# Patient Record
Sex: FEMALE | Race: WHITE
[De-identification: names, ages, dates, MRNs, and addresses within clinical notes are randomized per-mention and may not be internally consistent; named-entity substitution may affect disease eponyms.]

---

## 2019-10-21 ENCOUNTER — TRANSCRIPTION ENCOUNTER (OUTPATIENT)
Age: 24
End: 2019-10-21

## 2019-12-08 ENCOUNTER — TRANSCRIPTION ENCOUNTER (OUTPATIENT)
Age: 24
End: 2019-12-08

## 2019-12-28 ENCOUNTER — TRANSCRIPTION ENCOUNTER (OUTPATIENT)
Age: 24
End: 2019-12-28

## 2021-06-02 ENCOUNTER — HOSPITAL ENCOUNTER (EMERGENCY)
Dept: HOSPITAL 74 - JER | Age: 26
Discharge: HOME | End: 2021-06-02
Payer: COMMERCIAL

## 2021-06-02 VITALS — DIASTOLIC BLOOD PRESSURE: 78 MMHG | TEMPERATURE: 98.5 F | HEART RATE: 88 BPM | SYSTOLIC BLOOD PRESSURE: 116 MMHG

## 2021-06-02 VITALS — BODY MASS INDEX: 21.2 KG/M2

## 2021-06-02 DIAGNOSIS — R10.31: Primary | ICD-10-CM

## 2021-06-02 LAB
ALBUMIN SERPL-MCNC: 4.3 G/DL (ref 3.4–5)
ALP SERPL-CCNC: 60 U/L (ref 45–117)
ALT SERPL-CCNC: 28 U/L (ref 13–61)
ANION GAP SERPL CALC-SCNC: 6 MMOL/L (ref 8–16)
APPEARANCE UR: CLEAR
AST SERPL-CCNC: 19 U/L (ref 15–37)
BASOPHILS # BLD: 1.1 % (ref 0–2)
BILIRUB SERPL-MCNC: 0.9 MG/DL (ref 0.2–1)
BILIRUB UR STRIP.AUTO-MCNC: NEGATIVE MG/DL
BUN SERPL-MCNC: 7.2 MG/DL (ref 7–18)
CALCIUM SERPL-MCNC: 9.4 MG/DL (ref 8.5–10.1)
CHLORIDE SERPL-SCNC: 107 MMOL/L (ref 98–107)
CO2 SERPL-SCNC: 26 MMOL/L (ref 21–32)
COLOR UR: YELLOW
CREAT SERPL-MCNC: 0.6 MG/DL (ref 0.55–1.3)
DEPRECATED RDW RBC AUTO: 12.7 % (ref 11.6–15.6)
EOSINOPHIL # BLD: 1.3 % (ref 0–4.5)
GLUCOSE SERPL-MCNC: 91 MG/DL (ref 74–106)
HCT VFR BLD CALC: 39.7 % (ref 32.4–45.2)
HGB BLD-MCNC: 13.6 GM/DL (ref 10.7–15.3)
KETONES UR QL STRIP: NEGATIVE
LEUKOCYTE ESTERASE UR QL STRIP.AUTO: NEGATIVE
LIPASE SERPL-CCNC: 93 U/L (ref 73–393)
LYMPHOCYTES # BLD: 41 % (ref 8–40)
MCH RBC QN AUTO: 32.1 PG (ref 25.7–33.7)
MCHC RBC AUTO-ENTMCNC: 34.3 G/DL (ref 32–36)
MCV RBC: 93.4 FL (ref 80–96)
MONOCYTES # BLD AUTO: 11 % (ref 3.8–10.2)
NEUTROPHILS # BLD: 45.6 % (ref 42.8–82.8)
NITRITE UR QL STRIP: NEGATIVE
PH UR: 6 [PH] (ref 5–8)
PLATELET # BLD AUTO: 213 K/MM3 (ref 134–434)
PMV BLD: 9.6 FL (ref 7.5–11.1)
PROT SERPL-MCNC: 7.6 G/DL (ref 6.4–8.2)
PROT UR QL STRIP: NEGATIVE
PROT UR QL STRIP: NEGATIVE
RBC # BLD AUTO: 4.25 M/MM3 (ref 3.6–5.2)
SODIUM SERPL-SCNC: 139 MMOL/L (ref 136–145)
SP GR UR: 1.01 (ref 1.01–1.03)
UROBILINOGEN UR STRIP-MCNC: 0.2 MG/DL (ref 0.2–1)
WBC # BLD AUTO: 5.4 K/MM3 (ref 4–10)

## 2021-06-02 PROCEDURE — 3E0333Z INTRODUCTION OF ANTI-INFLAMMATORY INTO PERIPHERAL VEIN, PERCUTANEOUS APPROACH: ICD-10-PCS

## 2022-06-25 ENCOUNTER — RESULT REVIEW (OUTPATIENT)
Age: 27
End: 2022-06-25

## 2022-09-08 PROBLEM — Z00.00 ENCOUNTER FOR PREVENTIVE HEALTH EXAMINATION: Status: ACTIVE | Noted: 2022-09-08

## 2022-09-28 ENCOUNTER — APPOINTMENT (OUTPATIENT)
Dept: VASCULAR SURGERY | Facility: CLINIC | Age: 27
End: 2022-09-28

## 2022-09-28 ENCOUNTER — TRANSCRIPTION ENCOUNTER (OUTPATIENT)
Age: 27
End: 2022-09-28

## 2022-09-28 VITALS
DIASTOLIC BLOOD PRESSURE: 63 MMHG | BODY MASS INDEX: 19.99 KG/M2 | HEIGHT: 65 IN | WEIGHT: 120 LBS | HEART RATE: 81 BPM | SYSTOLIC BLOOD PRESSURE: 101 MMHG

## 2022-09-28 DIAGNOSIS — R63.4 ABNORMAL WEIGHT LOSS: ICD-10-CM

## 2022-09-28 DIAGNOSIS — Z87.42 PERSONAL HISTORY OF OTHER DISEASES OF THE FEMALE GENITAL TRACT: ICD-10-CM

## 2022-09-28 DIAGNOSIS — Z82.49 FAMILY HISTORY OF ISCHEMIC HEART DISEASE AND OTHER DISEASES OF THE CIRCULATORY SYSTEM: ICD-10-CM

## 2022-09-28 PROCEDURE — 99203 OFFICE O/P NEW LOW 30 MIN: CPT

## 2022-09-29 PROBLEM — R63.4 WEIGHT LOSS, UNINTENTIONAL: Status: ACTIVE | Noted: 2022-09-29

## 2022-09-29 PROBLEM — Z82.49 FAMILY HISTORY OF CARDIAC DISORDER: Status: ACTIVE | Noted: 2022-09-29

## 2022-09-29 PROBLEM — Z87.42 HISTORY OF ENDOMETRIOSIS: Status: RESOLVED | Noted: 2022-09-29 | Resolved: 2022-09-29

## 2022-10-03 NOTE — ADDENDUM
[FreeTextEntry1] : I, Dr. Piotr Casanova, personally performed the evaluation and management (E/M) services for this new patient.  That E/M includes conducting the initial examination, assessing all conditions, and establishing the plan of care.  Today, my ACP, Donya Wren NP, was here to observe my evaluation and management services for this patient to be followed going forward. \par \par The documentation for this encounter was entered by Danielito Stanley acting as a scribe for Dr.Gary Casanova.\par

## 2022-10-03 NOTE — CONSULT LETTER
[Dear  ___] : Dear  [unfilled], [FreeTextEntry2] : Lovely Panchal DO\par [address] [facility], [place], operated [facility]\[place] [address] [FreeTextEntry1] : I saw Ms Bernie Rhoades. She had a recent diagnosis of median arcuate ligament syndrome.  She has debilitating abdominal pain, nausea and vomiting along with significant weight loss. She has tried to gained weight back but is unable to tolerate solids. In retrospective, she believes her symptoms have been present for over 10 years but since Spring of 2021 she has had significant symptomatology.   She has had GI evaluation including upper and lower endoscopy along with gastric motility studies.  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 Legs are well perfused. Blood pressure 101/63, heart rate 81 b/min.\par \par After careful review of outside imaging, I have recommended her to see Dr Killian, our expert with advanced upper GI minimally invasive surgical techniques. She will likely be a candidate for robotic release of the median arcuate ligament. We will coordinate the procedure together. I will keep you posted regarding her status. \par \par My complete EMR office note is below for your records.  [FreeTextEntry3] : Sincerely, \par \par Piotr Casanova M.D. \par , Surgical Services [facility]\par , Department of Surgery [facility]\par Professor of Surgery, [employer] at [facility]

## 2022-10-03 NOTE — ASSESSMENT
[Arterial/Venous Disease] : arterial/venous disease [Medication Management] : medication management [FreeTextEntry1] : 28 y/o F w/ debilitating, persistent abd pain, nausea and vomiting with weight loss and foot intolerance and evidence of MALS on recent MRA. On exam, epigastric bruit, abd soft, and nontender. LEs well perfused with no edema. We reviewed all outside records and imaging. Recommended for her to see  to discuss procedure to release the median arcuate ligament via laparoscopic approach. Will then coordinate with Dr Killian to complete procedure together. Pt to continue home meds and stay well hydrated.

## 2022-10-03 NOTE — PHYSICAL EXAM
[Respiratory Effort] : normal respiratory effort [Normal Rate and Rhythm] : normal rate and rhythm [No Rash or Lesion] : No rash or lesion [Alert] : alert [Oriented to Person] : oriented to person [Oriented to Place] : oriented to place [Oriented to Time] : oriented to time [Calm] : calm [2+] : left 2+ [Abdominal Bruit] : abdominal bruit present [JVD] : no jugular venous distention  [Carotid Bruits] : no carotid bruits [Right Carotid Bruit] : no bruit heard over the right carotid [Left Carotid Bruit] : no bruit heard over the left carotid [Ankle Swelling (On Exam)] : not present [Abdomen Masses] : No abdominal masses [Abdomen Tenderness] : ~T ~M No abdominal tenderness [de-identified] : WN/WD [de-identified] : epigastric bruit  [de-identified] : FROM

## 2022-10-04 ENCOUNTER — NON-APPOINTMENT (OUTPATIENT)
Age: 27
End: 2022-10-04

## 2022-10-06 ENCOUNTER — APPOINTMENT (OUTPATIENT)
Dept: BARIATRICS | Facility: CLINIC | Age: 27
End: 2022-10-06

## 2022-10-06 VITALS
SYSTOLIC BLOOD PRESSURE: 105 MMHG | HEIGHT: 65 IN | WEIGHT: 123.38 LBS | TEMPERATURE: 97.3 F | BODY MASS INDEX: 20.55 KG/M2 | DIASTOLIC BLOOD PRESSURE: 70 MMHG | OXYGEN SATURATION: 99 % | HEART RATE: 80 BPM

## 2022-10-06 PROCEDURE — 99204 OFFICE O/P NEW MOD 45 MIN: CPT

## 2022-10-06 NOTE — ADDENDUM
[FreeTextEntry1] : Documented by Eusebia Goff acting as a scribe for RUIZ Benjamin on 10/06/2022\par

## 2022-10-06 NOTE — ASSESSMENT
[FreeTextEntry1] : Patient is a 27 year old F with PMHx of +PFO, and recently diagnosed MALS and PSHx of endometrioma fulguration who is referred by  for the evaluation of median arcuate ligament syndrome. She reports of nausea, vomiting and abdominal pain especially after meals which is reported to radiate around the ribs. States that she is very active and endorses pain while exercising. She has recently lost 16 lbs in one month due to pain. Intolerant to solid food. On famotidine, Dexilant and Norethindrone. Reports that her symptoms are usually persistent for months then subsides but reappears again.  She was diagnosed with MALS in early Sept by vascular surgeon at [place], Dr. Jeffers.  Reports that she has performed  different testing, including complete GI workup, colonoscopy, endoscopies, gastric emptying studies, HIDA scan, cardiac Doppler, Doppler US and a manometry.  MRA was done in July which revealed compression of the celiac artery Recent US Doppler revealed an increase of turbulence intensity and compression of the celiac artery. She is not on any pain medication. She is a .

## 2022-10-06 NOTE — HISTORY OF PRESENT ILLNESS
[de-identified] : Patient is a 27 year old F with PMHx of +PFO, and recently diagnosed MALS and PSHx of endometrioma fulguration who is referred by  for the evaluation of median arcuate ligament syndrome. She reports of nausea, vomiting and abdominal pain especially after meals which is reported to radiate around the ribs. States that she is very active and endorses pain while exercising. She has recently lost 16 lbs in one month due to pain. Intolerant to solid food. On famotidine, Dexilant and Norethindrone. Reports that her symptoms are usually persistent for months then subsides but reappears again.  She was diagnosed with MALS in early Sept by vascular surgeon at [place], Dr. Jeffers.  Reports that she has performed  different testing, including complete GI workup, colonoscopy, endoscopies, gastric emptying studies, HIDA scan, cardiac Doppler, Doppler US and a manometry.  MRA was done in July which revealed compression of the celiac arter Recent US Doppler revealed an increase of turbulence intensity and compression of the celiac artery. She is not on any pain medication. She is a .

## 2022-10-06 NOTE — END OF VISIT
[Time Spent: ___ minutes] : I have spent [unfilled] minutes of time on the encounter. [FreeTextEntry3] : All medical record entries made by the Scribe were at my, RUIZ Benjamin , direction and personally dictated by me on 10/06/2022 . I have reviewed the chart and agree that the record accurately reflects my personal performance of the history, physical exam, assessment and plan. I have also personally directed, reviewed, and agreed with the chart.\par

## 2022-10-06 NOTE — PHYSICAL EXAM
[No Rash or Lesion] : No rash or lesion [Alert] : alert [Oriented to Person] : oriented to person [Oriented to Place] : oriented to place [Oriented to Time] : oriented to time [Calm] : calm [de-identified] : not in any acute distress [de-identified] : full range of motion

## 2022-10-26 PROBLEM — Z00.00 ENCOUNTER FOR PREVENTIVE HEALTH EXAMINATION: Status: ACTIVE | Noted: 2022-10-26

## 2022-11-04 ENCOUNTER — APPOINTMENT (OUTPATIENT)
Dept: CARDIOLOGY | Facility: CLINIC | Age: 27
End: 2022-11-04

## 2022-11-09 ENCOUNTER — APPOINTMENT (OUTPATIENT)
Dept: GASTROENTEROLOGY | Facility: CLINIC | Age: 27
End: 2022-11-09

## 2022-11-21 ENCOUNTER — NON-APPOINTMENT (OUTPATIENT)
Age: 27
End: 2022-11-21

## 2022-11-21 ENCOUNTER — APPOINTMENT (OUTPATIENT)
Dept: CARDIOLOGY | Facility: CLINIC | Age: 27
End: 2022-11-21

## 2022-11-21 VITALS
HEIGHT: 65 IN | BODY MASS INDEX: 19.99 KG/M2 | WEIGHT: 120 LBS | OXYGEN SATURATION: 100 % | DIASTOLIC BLOOD PRESSURE: 66 MMHG | HEART RATE: 78 BPM | SYSTOLIC BLOOD PRESSURE: 103 MMHG

## 2022-11-21 DIAGNOSIS — Z87.898 PERSONAL HISTORY OF OTHER SPECIFIED CONDITIONS: ICD-10-CM

## 2022-11-21 PROCEDURE — 99204 OFFICE O/P NEW MOD 45 MIN: CPT

## 2022-11-21 PROCEDURE — 99244 OFF/OP CNSLTJ NEW/EST MOD 40: CPT

## 2022-11-21 PROCEDURE — 93000 ELECTROCARDIOGRAM COMPLETE: CPT

## 2022-12-07 NOTE — REASON FOR VISIT
[FreeTextEntry3] : DearDr.Jocelyne. \par         . I saw your patient JULIO C AUGUST on 11/21/2022 . Please see the note below for the assessment and plan. \par \par JULIO C AUGUST  was seen  for an initial consultation at the Cardiogenomics Program at [facility] on 11/21/2022.   Ms. AUGUST was referred by   for hereditary cardiac predisposition risk assessment and counseling, due to concern for EDS\par \par

## 2022-12-07 NOTE — PHYSICAL EXAM
Mom called back. Mom informed that pt is allowed to ride a bike, 3-4 weeks post-op. Pt is now about 3.5 weeks post op. Mom informed of this and to let pain be pt's guide. If riding a bike is too painful, not to do it. Discouraged long bike rides as well.  Violet Samples [Extraocular Movements Intact] : extraocular movements were intact [Normal Uvula] : normal uvula [Normal] : no mass, no hepatosplenomegaly [Scoliosis] : scoliosis [Right] : Right: Y [Left] : Left: Y [] : Yes [Total Score ___] : Total Score = [unfilled] [Unusually soft or velvety skin] : Unusually soft or velvety skin [Mild skin hyperextensibility] : Mild skin hyperextensibility [Cleft Lip] : no cleft lip [Pectus Deformity] : no pectus deformity [Birthmark] : no birthmark [Tremor] : no tremor [de-identified] : high palate [FreeTextEntry2] : 166 [FreeTextEntry3] : 172 [FreeTextEntry6] : 85 [TWNoteComboBox1] : 0 [TWNoteComboBox2] : 1 [TWNoteComboBox6] : 1 [TWNoteComboBox7] : 0 [de-identified] : 1 [de-identified] : 1

## 2022-12-07 NOTE — HISTORY OF PRESENT ILLNESS
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

## 2022-12-07 NOTE — FAMILY HISTORY
[FreeTextEntry1] : FamilyHistory_20_twCiteListControlStart FamilyHistory_20_twCiteListControlEnd [id number] \par A four-generation family history was constructed and scanned into [employer]. \par Family history is significant for: \par siblings\par 28 yo borther healthy\par no children\par \par Mother 67 yo hx HLD, low bone density\par Maternal aunts x1, 63 yo siatica, low bone density, hypermobility\par Maternal uncles none\par Maternal Grandmother dec 93 yo eds like symptoms\par Maternal Grandfather dec 84 yo hx CAD s/p PCI. aplastic anemia\par \par Father 68 yo hx HTN, hx MI medically managed \par Paternal aunts x1 dec 50s br CA, + flexible: son 41 yo hx DM , son 45 yo\par Paternal uncles none\par Paternal Grandfather flexible dec cirrhosis \par Paternal Grandmother dec late 70s br CA\par \par children:none\par \par her maternal families originate from [facility] and paternal families originate from , possible [place]\par No Ashkenazi Restorationist ancestry. \par Family history was negative for consanguinity  \par No family history of SIDS\par  \par \par  [FreeTextEntry2] : [place]  [FreeTextEntry3] : , possible [place]

## 2023-01-23 ENCOUNTER — APPOINTMENT (OUTPATIENT)
Dept: CARDIOLOGY | Facility: CLINIC | Age: 28
End: 2023-01-23

## 2023-01-25 ENCOUNTER — APPOINTMENT (OUTPATIENT)
Dept: CARDIOLOGY | Facility: CLINIC | Age: 28
End: 2023-01-25
Payer: MEDICAID

## 2023-01-25 DIAGNOSIS — Z87.898 PERSONAL HISTORY OF OTHER SPECIFIED CONDITIONS: ICD-10-CM

## 2023-01-25 PROCEDURE — 99214 OFFICE O/P EST MOD 30 MIN: CPT | Mod: 95

## 2023-01-25 NOTE — HISTORY OF PRESENT ILLNESS
[Home] : at home, [unfilled] , at the time of the visit. 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

## 2023-02-07 ENCOUNTER — APPOINTMENT (OUTPATIENT)
Dept: RHEUMATOLOGY | Facility: CLINIC | Age: 28
End: 2023-02-07
Payer: MEDICAID

## 2023-02-07 ENCOUNTER — LABORATORY RESULT (OUTPATIENT)
Age: 28
End: 2023-02-07

## 2023-02-07 VITALS
SYSTOLIC BLOOD PRESSURE: 101 MMHG | HEART RATE: 70 BPM | BODY MASS INDEX: 19.99 KG/M2 | DIASTOLIC BLOOD PRESSURE: 65 MMHG | WEIGHT: 120 LBS | RESPIRATION RATE: 18 BRPM | HEIGHT: 65 IN | OXYGEN SATURATION: 100 % | TEMPERATURE: 97.4 F

## 2023-02-07 DIAGNOSIS — R10.9 UNSPECIFIED ABDOMINAL PAIN: ICD-10-CM

## 2023-02-07 DIAGNOSIS — R09.89 OTHER SPECIFIED SYMPTOMS AND SIGNS INVOLVING THE CIRCULATORY AND RESPIRATORY SYSTEMS: ICD-10-CM

## 2023-02-07 DIAGNOSIS — R11.2 NAUSEA WITH VOMITING, UNSPECIFIED: ICD-10-CM

## 2023-02-07 DIAGNOSIS — K55.1 CHRONIC VASCULAR DISORDERS OF INTESTINE: ICD-10-CM

## 2023-02-07 PROCEDURE — 99204 OFFICE O/P NEW MOD 45 MIN: CPT | Mod: 25

## 2023-02-07 RX ORDER — DEXLANSOPRAZOLE 60 MG/1
60 CAPSULE, DELAYED RELEASE ORAL
Refills: 0 | Status: DISCONTINUED | COMMUNITY
End: 2023-02-07

## 2023-02-07 RX ORDER — PROMETHAZINE HYDROCHLORIDE 25 MG/1
25 TABLET ORAL 3 TIMES DAILY
Qty: 40 | Refills: 0 | Status: DISCONTINUED | COMMUNITY
Start: 2022-10-06 | End: 2023-02-07

## 2023-02-07 RX ORDER — FAMOTIDINE 40 MG/1
40 TABLET, FILM COATED ORAL
Refills: 0 | Status: DISCONTINUED | COMMUNITY
End: 2023-02-07

## 2023-02-07 RX ORDER — NORETHINDRONE 0.35 MG/1
0.35 TABLET ORAL
Refills: 0 | Status: DISCONTINUED | COMMUNITY
End: 2023-02-07

## 2023-02-07 RX ORDER — FLUDROCORTISONE ACETATE 0.1 MG/1
0.1 TABLET ORAL
Qty: 180 | Refills: 0 | Status: DISCONTINUED | COMMUNITY
Start: 2023-01-25 | End: 2023-02-07

## 2023-02-08 LAB
ALBUMIN SERPL ELPH-MCNC: 4.9 G/DL
ALP BLD-CCNC: 65 U/L
ALT SERPL-CCNC: 15 U/L
ANCA AB SER-IMP: NEGATIVE
ANION GAP SERPL CALC-SCNC: 12 MMOL/L
APPEARANCE: CLEAR
AST SERPL-CCNC: 16 U/L
BACTERIA: NEGATIVE
BASOPHILS # BLD AUTO: 0.04 K/UL
BASOPHILS NFR BLD AUTO: 0.6 %
BILIRUB SERPL-MCNC: 1.2 MG/DL
BILIRUBIN URINE: NEGATIVE
BLOOD URINE: NEGATIVE
BUN SERPL-MCNC: 7 MG/DL
C-ANCA SER-ACNC: NEGATIVE
CALCIUM SERPL-MCNC: 10 MG/DL
CD16+CD56+ CELLS # BLD: 143 CELLS/UL
CD16+CD56+ CELLS NFR BLD: 6 %
CD19 CELLS NFR BLD: 459 CELLS/UL
CD3 CELLS # BLD: 1796 CELLS/UL
CD3 CELLS NFR BLD: 73 %
CD3+CD4+ CELLS # BLD: 993 CELLS/UL
CD3+CD4+ CELLS NFR BLD: 40 %
CD3+CD4+ CELLS/CD3+CD8+ CLL SPEC: 1.4 RATIO
CD3+CD8+ CELLS # SPEC: 708 CELLS/UL
CD3+CD8+ CELLS NFR BLD: 29 %
CELLS.CD3-CD19+/CELLS IN BLOOD: 19 %
CHLORIDE SERPL-SCNC: 104 MMOL/L
CO2 SERPL-SCNC: 24 MMOL/L
COLOR: COLORLESS
CREAT SERPL-MCNC: 0.61 MG/DL
CREAT SPEC-SCNC: 20 MG/DL
CREAT/PROT UR: NORMAL RATIO
CRP SERPL-MCNC: <3 MG/L
EGFR: 125 ML/MIN/1.73M2
EOSINOPHIL # BLD AUTO: 0.06 K/UL
EOSINOPHIL NFR BLD AUTO: 0.9 %
ERYTHROCYTE [SEDIMENTATION RATE] IN BLOOD BY WESTERGREN METHOD: 4 MM/HR
GLUCOSE QUALITATIVE U: NEGATIVE
GLUCOSE SERPL-MCNC: 84 MG/DL
HCT VFR BLD CALC: 38.4 %
HGB BLD-MCNC: 12.3 G/DL
HYALINE CASTS: 1 /LPF
IMM GRANULOCYTES NFR BLD AUTO: 0.3 %
KETONES URINE: NORMAL
LEUKOCYTE ESTERASE URINE: NEGATIVE
LYMPHOCYTES # BLD AUTO: 2.53 K/UL
LYMPHOCYTES NFR BLD AUTO: 39.2 %
MAN DIFF?: NORMAL
MCHC RBC-ENTMCNC: 30.8 PG
MCHC RBC-ENTMCNC: 32 GM/DL
MCV RBC AUTO: 96.2 FL
MICROSCOPIC-UA: NORMAL
MONOCYTES # BLD AUTO: 0.52 K/UL
MONOCYTES NFR BLD AUTO: 8 %
NEUTROPHILS # BLD AUTO: 3.29 K/UL
NEUTROPHILS NFR BLD AUTO: 51 %
NITRITE URINE: NEGATIVE
P-ANCA TITR SER IF: NEGATIVE
PH URINE: 7.5
PLATELET # BLD AUTO: 237 K/UL
POTASSIUM SERPL-SCNC: 4.1 MMOL/L
PROT SERPL-MCNC: 7.3 G/DL
PROT UR-MCNC: <4 MG/DL
PROTEIN URINE: NEGATIVE
RBC # BLD: 3.99 M/UL
RBC # FLD: 12.5 %
RED BLOOD CELLS URINE: 3 /HPF
SODIUM SERPL-SCNC: 140 MMOL/L
SPECIFIC GRAVITY URINE: 1
SQUAMOUS EPITHELIAL CELLS: 1 /HPF
UROBILINOGEN URINE: NORMAL
WBC # FLD AUTO: 6.46 K/UL
WHITE BLOOD CELLS URINE: 1 /HPF

## 2023-02-09 LAB
IGG SUBSET TOTAL IGG: 1037 MG/DL
IGG1 SER-MCNC: 555 MG/DL
IGG2 SER-MCNC: 251 MG/DL
IGG3 SER-MCNC: 77 MG/DL
IGG4 SER-MCNC: 13 MG/DL
PROTEINASE3 AB SER IA-ACNC: 5.9 UNITS
PROTEINASE3 AB SER-ACNC: NEGATIVE

## 2023-02-10 LAB
A-TUMOR NECROSIS FACT SERPL-MCNC: 18 PG/ML
ALBUMIN MFR SERPL ELPH: 61.5 %
ALBUMIN SERPL-MCNC: 4.5 G/DL
ALBUMIN/GLOB SERPL: 1.6 RATIO
ALPHA1 GLOB MFR SERPL ELPH: 4 %
ALPHA1 GLOB SERPL ELPH-MCNC: 0.3 G/DL
ALPHA2 GLOB MFR SERPL ELPH: 8.2 %
ALPHA2 GLOB SERPL ELPH-MCNC: 0.6 G/DL
B-GLOBULIN MFR SERPL ELPH: 10.5 %
B-GLOBULIN SERPL ELPH-MCNC: 0.8 G/DL
DEPRECATED KAPPA LC FREE/LAMBDA SER: 1.24 RATIO
GAMMA GLOB FLD ELPH-MCNC: 1.2 G/DL
GAMMA GLOB MFR SERPL ELPH: 15.8 %
IGA SER QL IEP: 156 MG/DL
IGG SER QL IEP: 1101 MG/DL
IGM SER QL IEP: 210 MG/DL
IGNF SERPL-MCNC: <4.2 PG/ML
IL10 SERPL-MCNC: <2.8 PG/ML
IL12 SERPL-MCNC: 2.5 PG/ML
IL13 SERPL-MCNC: 12.3 PG/ML
IL17A SERPL-MCNC: 2.2 PG/ML
IL2 SERPL-MCNC: 644.8 PG/ML
IL2 SERPL-MCNC: <2.1 PG/ML
IL4 SERPL-MCNC: <2.2 PG/ML
IL6 SERPL-MCNC: 7.1 PG/ML
IL8 SERPL-MCNC: <3 PG/ML
INTERLEUKIN 1 BETA: <6.5 PG/ML
INTERLEUKIN 5: <2.1 PG/ML
INTERPRETATION SERPL IEP-IMP: NORMAL
KAPPA LC CSF-MCNC: 0.98 MG/DL
KAPPA LC SERPL-MCNC: 1.22 MG/DL
M PROTEIN SPEC IFE-MCNC: NORMAL
MYELOPEROXIDASE AB SER QL IA: <5 UNITS
MYELOPEROXIDASE CELLS FLD QL: NEGATIVE
PROT SERPL-MCNC: 7.3 G/DL
PROT SERPL-MCNC: 7.3 G/DL
VASCULAR ENDOTHELIAL GF: 10 PG/ML

## 2023-02-13 LAB
ANTI-BETA2 GLYCOPROTEIN 1 IGA CONCENTRATION: 2 U/ML
ANTI-BETA2 GLYCOPROTEIN 1 IGG CONCENTRATION: 1 U/ML
ANTI-BETA2 GLYCOPROTEIN 1 IGG CONCENTRATION: 1 U/ML
ANTI-BETA2 GLYCOPROTEIN 1 IGM CONCENTRATION: <2.4 U/ML
ANTI-BETA2 GLYCOPROTEIN 1 IGM CONCENTRATION: <2.4 U/ML
ANTI-C1Q IGG CONCENTRATION: 8 UNITS
ANTI-CARDIOLIPIN IGA CONCENTRATION: 2 APL
ANTI-CARDIOLIPIN IGG CONCENTRATION: 1 GPL
ANTI-CARDIOLIPIN IGG CONCENTRATION: 1 GPL
ANTI-CARDIOLIPIN IGM CONCENTRATION: 2 MPL
ANTI-CARDIOLIPIN IGM CONCENTRATION: 2 MPL
ANTI-CENP IGG CONCENTRATION: <0.4 U/ML
ANTI-CYCLIC CITRULLINATED PEPTIDE IGG CONCENTRATION: 2 U/ML
ANTI-DOUBLE-STRANDED DNA IGG CONCENTRATION: 34 IU/ML
ANTI-HISTONE IGG CONCENTRATION: 0 UNITS
ANTI-JO-1 IGG CONCENTRATION: <0.3 U/ML
ANTI-NUCLEAR ANTIBODIES - CYTOPLASMIC PATTERN: NORMAL
ANTI-NUCLEAR ANTIBODIES - PRIMARY NUCLEAR PATTERN: NORMAL
ANTI-NUCLEAR ANTIBODIES - PRIMARY PATTERN TITER: NEGATIVE
ANTI-NUCLEAR ANTIBODIES IGG CONCENTRATION: 12 UNITS
ANTI-PHOSPHATIDYLSERINE/PROTHROMBIN IGG CONCENTRATION: 9 UNITS
ANTI-PHOSPHATIDYLSERINE/PROTHROMBIN IGM CONCENTRATION: 37 UNITS
ANTI-RIBOSOMAL P IGG CONCENTRATION: <0.5 U/ML
ANTI-RNA POL III IGG CONCENTRATION: <0.7 U/ML
ANTI-RNP70 IGG CONCENTRATION: 0 U/ML
ANTI-RO52 IGG CONCENTRATION: <0.3 U/ML
ANTI-RO60 IGG CONCENTRATION: <0.4 U/ML
ANTI-SCL-70 IGG CONCENTRATION: 1 U/ML
ANTI-SMITH IGG CONCENTRATION: <0.7 U/ML
ANTI-SS-B (LA) IGG CONCENTRATION: <0.4 U/ML
ANTI-THYROGLOBULIN IGG CONCENTRATION: 20 IU/ML
ANTI-THYROID PEROXIDASE IGG CONCENTRATION: <4 IU/ML
ANTI-U1RNP IGG CONCENTRATION: 2 U/ML
AVISE LUPUS INDEX: -2
AVISE LUPUS RESULT: NEGATIVE
B-LYMPHOCYTE-BOUND C4D (BC4D) LEVEL: 23
ERYTHROCYTE-BOUND C4D (EC4D) LEVEL: 3
RHEUMATOID FACTOR (IGA) CONCENTRATION: 2 IU/ML
RHEUMATOID FACTOR (IGM) CONCENTRATION: 1 IU/ML

## 2023-02-14 LAB
MISCELLANEOUS TEST: NORMAL
PROC NAME: NORMAL

## 2023-02-16 ENCOUNTER — APPOINTMENT (OUTPATIENT)
Dept: HEART AND VASCULAR | Facility: CLINIC | Age: 28
End: 2023-02-16

## 2023-02-20 LAB
CA VI IGA AB: 3.4 EU/ML
CA VI IGG AB: 5.9 EU/ML
CA VI IGM AB: 10.3 EU/ML
PSP IGA AB: NORMAL
PSP IGG AB: 63.6 EU/ML
PSP IGM AB: 5.9 EU/ML
SEROLOGY COMMENTS: NORMAL
SP-1 IGA AB: 1.1 EU/ML
SP-1 IGG AB: 93.6 EU/ML
SP-1 IGM AB: 16.2 EU/ML

## 2023-02-21 ENCOUNTER — APPOINTMENT (OUTPATIENT)
Dept: CARDIOLOGY | Facility: CLINIC | Age: 28
End: 2023-02-21
Payer: MEDICAID

## 2023-02-21 PROBLEM — Z87.898 HISTORY OF PALPITATIONS: Status: ACTIVE | Noted: 2023-02-21

## 2023-02-21 LAB
CENP-A: <11 SI
CENP-B: <11 SI
FIBRILLARIN: <11 SI
PM/SCL-100: <11 SI
PM/SCL-75: <11 SI
RP11: <11 SI
RP155: <11 SI
SCL-70: <11 SI
TH/TO: <11 SI
U1-SNRNP RNP A: <11 SI
U1-SNRNP RNP C: <11 SI
U1-SNRNP RNP-70KD: <11 SI

## 2023-02-21 PROCEDURE — 99215 OFFICE O/P EST HI 40 MIN: CPT | Mod: 95

## 2023-02-21 NOTE — REASON FOR VISIT
[FreeTextEntry3] : Dear Dr. Evans     . I saw your patient MARISA AUGUST on 11/21/2022 . Please see the note below for the assessment and plan. \par \par MARISA AUGUST  was seen  for an initial consultation at the Cardiogenomics Program at [facility] on 11/21/2022.   Ms. AUGUST was referred by  Nathan davenport for hereditary cardiac predisposition risk assessment and counseling, due to concern for CTD\par

## 2023-02-21 NOTE — HISTORY OF PRESENT ILLNESS
[Home] : at home, [unfilled] , at the time of the visit. [Medical Office: ([facility])___] : at the medical office located in  [Verbal consent obtained from patient] : the patient, [unfilled] [FreeTextEntry1] : MARISA AUGUST is a 28 yo F hx EDS, median arcuate ligament syndrome s/p release, Migranes , POTS, and SMA stenosis\par \par she underwent genetic testing with the HDCT panel and today presents for results

## 2023-02-21 NOTE — REVIEW OF SYSTEMS
[Patient Intake Form Reviewed] : Patient intake form was reviewed [Palpitations] : palpitations [Exercise Intolerance] : exercise intolerance

## 2023-02-21 NOTE — HISTORY OF PRESENT ILLNESS
[FreeTextEntry1] : MARISA AUGUST is a 28 yo F hx EDS, median arcuate ligament syndrome s/p release, Migranes , POTS, and SMA stenosis\par \par she underwent genetic testing with the HDCT panel and today presents for results

## 2023-02-21 NOTE — REASON FOR VISIT
[FreeTextEntry3] : Dear Dr. Evans     . I saw your patient MARISA AUGUST on 2/21/23. Please see the note below for the assessment and plan. \par \par MARISA AUGUST  was seen  for an initial consultation at the Cardiogenomics Program at [facility] on 11/21/2022.   Ms. AUGUST was referred by  Nathan davenport for hereditary cardiac predisposition risk assessment and counseling, due to concern for CTD\par

## 2023-02-22 ENCOUNTER — APPOINTMENT (OUTPATIENT)
Dept: ELECTROPHYSIOLOGY | Facility: CLINIC | Age: 28
End: 2023-02-22
Payer: MEDICAID

## 2023-02-22 ENCOUNTER — APPOINTMENT (OUTPATIENT)
Dept: CARDIOLOGY | Facility: CLINIC | Age: 28
End: 2023-02-22

## 2023-02-22 LAB
EJ AB SER QL: NEGATIVE
ENA JO1 AB SER IA-ACNC: <20 UNITS
ENA PM/SCL AB SER-ACNC: <20 UNITS
ENA SM+RNP AB SER IA-ACNC: <20 UNITS
ENA SS-A IGG SER QL: <20 UNITS
FIBRILLARIN AB SER QL: NEGATIVE
KU AB SER QL: NEGATIVE
MDA-5 (P140)(CADM-140): <20 UNITS
MI2 AB SER QL: NEGATIVE
NXP-2 (P140): <20 UNITS
OJ AB SER QL: NEGATIVE
PL12 AB SER QL: NEGATIVE
PL7 AB SER QL: NEGATIVE
SRP AB SERPL QL: NEGATIVE
TIF GAMMA (P155/140): <20 UNITS
U2 SNRNP AB SER QL: NEGATIVE

## 2023-03-05 LAB — PERIODIC FEVER SYNDROMES PANEL (7 GENES): NEGATIVE

## 2023-03-06 ENCOUNTER — FORM ENCOUNTER (OUTPATIENT)
Age: 28
End: 2023-03-06

## 2023-03-15 ENCOUNTER — APPOINTMENT (OUTPATIENT)
Dept: HEART AND VASCULAR | Facility: CLINIC | Age: 28
End: 2023-03-15

## 2023-03-16 ENCOUNTER — NON-APPOINTMENT (OUTPATIENT)
Age: 28
End: 2023-03-16

## 2023-03-29 PROCEDURE — 93248 EXT ECG>7D<15D REV&INTERPJ: CPT

## 2023-04-07 ENCOUNTER — APPOINTMENT (OUTPATIENT)
Dept: RHEUMATOLOGY | Facility: CLINIC | Age: 28
End: 2023-04-07
Payer: MEDICAID

## 2023-04-07 DIAGNOSIS — I77.4 CELIAC ARTERY COMPRESSION SYNDROME: ICD-10-CM

## 2023-04-07 DIAGNOSIS — R21 RASH AND OTHER NONSPECIFIC SKIN ERUPTION: ICD-10-CM

## 2023-04-07 DIAGNOSIS — R89.4 ABNORMAL IMMUNOLOGICAL FINDINGS IN SPECIMENS FROM OTHER ORGANS, SYSTEMS AND TISSUES: ICD-10-CM

## 2023-04-07 PROCEDURE — 99214 OFFICE O/P EST MOD 30 MIN: CPT | Mod: 95

## 2023-04-10 ENCOUNTER — LABORATORY RESULT (OUTPATIENT)
Age: 28
End: 2023-04-10

## 2023-04-10 PROBLEM — I77.4 MEDIAN ARCUATE LIGAMENT SYNDROME: Status: ACTIVE | Noted: 2022-09-29

## 2023-04-10 PROBLEM — R89.4 SEROLOGIC ABNORMALITY: Status: ACTIVE | Noted: 2023-03-16

## 2023-04-10 LAB
25(OH)D3 SERPL-MCNC: 38.5 NG/ML
BASOPHILS # BLD AUTO: 0.05 K/UL
BASOPHILS NFR BLD AUTO: 0.9 %
EOSINOPHIL # BLD AUTO: 0.02 K/UL
EOSINOPHIL NFR BLD AUTO: 0.4 %
ERYTHROCYTE [SEDIMENTATION RATE] IN BLOOD BY WESTERGREN METHOD: 4 MM/HR
HCT VFR BLD CALC: 34.8 %
HGB BLD-MCNC: 11.4 G/DL
IMM GRANULOCYTES NFR BLD AUTO: 0.2 %
LYMPHOCYTES # BLD AUTO: 2.22 K/UL
LYMPHOCYTES NFR BLD AUTO: 40.5 %
MAN DIFF?: NORMAL
MCHC RBC-ENTMCNC: 30.7 PG
MCHC RBC-ENTMCNC: 32.8 GM/DL
MCV RBC AUTO: 93.8 FL
MONOCYTES # BLD AUTO: 0.55 K/UL
MONOCYTES NFR BLD AUTO: 10 %
NEUTROPHILS # BLD AUTO: 2.63 K/UL
NEUTROPHILS NFR BLD AUTO: 48 %
PLATELET # BLD AUTO: 213 K/UL
RBC # BLD: 3.71 M/UL
RBC # FLD: 12 %
WBC # FLD AUTO: 5.48 K/UL

## 2023-04-11 LAB
ALBUMIN SERPL ELPH-MCNC: 4.4 G/DL
ALP BLD-CCNC: 62 U/L
ALT SERPL-CCNC: 13 U/L
ANION GAP SERPL CALC-SCNC: 10 MMOL/L
AST SERPL-CCNC: 18 U/L
BILIRUB SERPL-MCNC: 1 MG/DL
BUN SERPL-MCNC: 6 MG/DL
CALCIUM SERPL-MCNC: 9.5 MG/DL
CHLORIDE SERPL-SCNC: 104 MMOL/L
CO2 SERPL-SCNC: 25 MMOL/L
CREAT SERPL-MCNC: 0.58 MG/DL
CRP SERPL-MCNC: <3 MG/L
EGFR: 126 ML/MIN/1.73M2
GLUCOSE SERPL-MCNC: 96 MG/DL
POTASSIUM SERPL-SCNC: 4.4 MMOL/L
PROT SERPL-MCNC: 6.4 G/DL
SODIUM SERPL-SCNC: 139 MMOL/L

## 2023-04-12 LAB
A PHAGOCYTOPH IGG TITR SER IF: NORMAL TITER
A-TUMOR NECROSIS FACT SERPL-MCNC: 28.2 PG/ML
B BURGDOR AB SER QL IA: NEGATIVE
B MICROTI IGG TITR SER: NORMAL TITER
E CHAFFEENSIS IGG TITR SER IF: NORMAL TITER
IGNF SERPL-MCNC: <4.2 PG/ML
IL10 SERPL-MCNC: 3.5 PG/ML
IL12 SERPL-MCNC: 2.3 PG/ML
IL13 SERPL-MCNC: 7.5 PG/ML
IL17A SERPL-MCNC: 1.4 PG/ML
IL2 SERPL-MCNC: 872.9 PG/ML
IL2 SERPL-MCNC: <2.1 PG/ML
IL4 SERPL-MCNC: <2.2 PG/ML
IL6 SERPL-MCNC: 4.5 PG/ML
IL8 SERPL-MCNC: <3 PG/ML
INTERLEUKIN 1 BETA: <6.5 PG/ML
INTERLEUKIN 5: <2.1 PG/ML
R RICKETTSI IGG CSF-ACNC: NEGATIVE
R RICKETTSI IGM CSF-ACNC: 0.56 INDEX

## 2023-04-13 LAB
ANAPLASMA PHAGOCYTOPHILIA IGG ANTIBODIES: NEGATIVE
ANAPLASMA PHAGOCYTOPHILIA IGM ANTIBODIES: NEGATIVE
BABESIA ANTIBODIES, IGG: NORMAL
BABESIA ANTIBODIES, IGM: NORMAL
EHRLICIA CHAFFEENIS IGG ANTIBODIES: NEGATIVE
EHRLICIA CHAFFEENIS IGM ANTIBODIES: NEGATIVE

## 2023-05-09 ENCOUNTER — APPOINTMENT (OUTPATIENT)
Dept: HEART AND VASCULAR | Facility: CLINIC | Age: 28
End: 2023-05-09
Payer: MEDICAID

## 2023-05-09 ENCOUNTER — NON-APPOINTMENT (OUTPATIENT)
Age: 28
End: 2023-05-09

## 2023-05-09 VITALS
WEIGHT: 123 LBS | SYSTOLIC BLOOD PRESSURE: 98 MMHG | TEMPERATURE: 98.3 F | HEART RATE: 82 BPM | OXYGEN SATURATION: 99 % | DIASTOLIC BLOOD PRESSURE: 54 MMHG | BODY MASS INDEX: 20.49 KG/M2 | HEIGHT: 65 IN

## 2023-05-09 DIAGNOSIS — Q21.1 ATRIAL SEPTAL DEFECT: ICD-10-CM

## 2023-05-09 DIAGNOSIS — G90.A POSTURAL ORTHOSTATIC TACHYCARDIA SYNDROME [POTS]: ICD-10-CM

## 2023-05-09 PROCEDURE — 93000 ELECTROCARDIOGRAM COMPLETE: CPT

## 2023-05-09 PROCEDURE — 99243 OFF/OP CNSLTJ NEW/EST LOW 30: CPT

## 2023-05-10 PROBLEM — G90.A POTS (POSTURAL ORTHOSTATIC TACHYCARDIA SYNDROME): Status: ACTIVE | Noted: 2023-01-25

## 2023-05-10 NOTE — ASSESSMENT
[FreeTextEntry1] : - Today no orthostasis and her heart rate has not increased upon standing\par - Most of her symptoms are mostly orthostatic \par - we can consider droxydopa if she continues to have severe symptoms\par - she will start exercises education done and pamphlet given\par - compression stockings\par - echo to rule out PFO and MVP\par - fu in three months

## 2023-05-10 NOTE — HISTORY OF PRESENT ILLNESS
[FreeTextEntry1] : 28 [place] POTS Dysautonomia EDS Hypermobile Type MALS s/p arcuate release and removed celiac ganglion resection at [facility] Endometriosis MIgraines Father MI 56 Paternal FH of Aneurysm EMbolism CVA \par \par \par \par referred by a friend. wanted to better control her symptoms her POTS has gotten worse since surgery for MALS 12/2022 she is very dizzy when she gets up she has presyncopal symptoms a lot when she gets up has gotten a little better overwhelming fatigue. SHe is supposed to drink a lot of water and cannot drink that much. she wears compression stockings which do help. She is pretty active at work she stands most of the day she is a . she rides her bike a lot. she has a hard time walking up stairs she feels very dizzy. she also has palpitations. intolerant of bb due to fatigue. she is intolerant of fludrocortisone with swelling, midodrine due bloating and consitipation. \par \par \par ecg nsr 5/9/2023 \par \par

## 2023-05-15 ENCOUNTER — APPOINTMENT (OUTPATIENT)
Dept: HEART AND VASCULAR | Facility: CLINIC | Age: 28
End: 2023-05-15
Payer: MEDICAID

## 2023-05-15 VITALS
WEIGHT: 122 LBS | DIASTOLIC BLOOD PRESSURE: 56 MMHG | BODY MASS INDEX: 20.33 KG/M2 | HEIGHT: 65 IN | SYSTOLIC BLOOD PRESSURE: 90 MMHG

## 2023-05-15 PROCEDURE — 93306 TTE W/DOPPLER COMPLETE: CPT

## 2023-05-24 ENCOUNTER — APPOINTMENT (OUTPATIENT)
Dept: CARDIOLOGY | Facility: CLINIC | Age: 28
End: 2023-05-24
Payer: MEDICAID

## 2023-05-24 PROCEDURE — 99214 OFFICE O/P EST MOD 30 MIN: CPT | Mod: 95

## 2023-05-24 NOTE — HISTORY OF PRESENT ILLNESS
[Other Location: e.g. School (Enter Location, City,State)___] : at [unfilled], at the time of the visit. [Medical Office: ([facility])___] : at the medical office located in  [Verbal consent obtained from patient] : the patient, [unfilled] [FreeTextEntry1] : JULIO C AUGUST is a 27yo F PMH EDS, median arcuate ligament syndrome s/p release, Migranes , POTS, and SMA stenosis\par \par she states her POTS did worsen after her MALS surgery, now mild improvement \par she does admit to significant fatigue\par she takes atenolol when her symptoms are high\par when she takes it daily she has too much fatigue, even when taken at night\par \par she drinks around 1L daily, unable to increase po fluid \par

## 2023-06-15 ENCOUNTER — TRANSCRIPTION ENCOUNTER (OUTPATIENT)
Age: 28
End: 2023-06-15

## 2023-06-15 DIAGNOSIS — J02.9 ACUTE PHARYNGITIS, UNSPECIFIED: ICD-10-CM

## 2023-06-15 DIAGNOSIS — R50.9 FEVER, UNSPECIFIED: ICD-10-CM

## 2023-06-15 DIAGNOSIS — R59.0 LOCALIZED ENLARGED LYMPH NODES: ICD-10-CM

## 2023-06-15 LAB
ALBUMIN SERPL ELPH-MCNC: 4.7 G/DL
ALP BLD-CCNC: 71 U/L
ALT SERPL-CCNC: 12 U/L
ANION GAP SERPL CALC-SCNC: 16 MMOL/L
AST SERPL-CCNC: 16 U/L
BILIRUB SERPL-MCNC: 0.7 MG/DL
BUN SERPL-MCNC: 9 MG/DL
CALCIUM SERPL-MCNC: 9.7 MG/DL
CHLORIDE SERPL-SCNC: 104 MMOL/L
CO2 SERPL-SCNC: 20 MMOL/L
CREAT SERPL-MCNC: 0.67 MG/DL
CRP SERPL-MCNC: 3 MG/L
EGFR: 121 ML/MIN/1.73M2
FERRITIN SERPL-MCNC: 27 NG/ML
GLUCOSE SERPL-MCNC: 105 MG/DL
POTASSIUM SERPL-SCNC: 4.8 MMOL/L
PROT SERPL-MCNC: 7.2 G/DL
SODIUM SERPL-SCNC: 140 MMOL/L
TRIGL SERPL-MCNC: 154 MG/DL

## 2023-06-16 LAB
DEPRECATED KAPPA LC FREE/LAMBDA SER: 1.12 RATIO
ERYTHROCYTE [SEDIMENTATION RATE] IN BLOOD BY WESTERGREN METHOD: 27 MM/HR
IGA SER QL IEP: 173 MG/DL
IGG SER QL IEP: 1028 MG/DL
IGM SER QL IEP: 266 MG/DL
KAPPA LC CSF-MCNC: 1.38 MG/DL
KAPPA LC SERPL-MCNC: 1.54 MG/DL

## 2023-06-17 LAB
A-TUMOR NECROSIS FACT SERPL-MCNC: 21.4 PG/ML
IGNF SERPL-MCNC: <4.2 PG/ML
IL10 SERPL-MCNC: 9.6 PG/ML
IL12 SERPL-MCNC: <1.9 PG/ML
IL13 SERPL-MCNC: 3.5 PG/ML
IL17A SERPL-MCNC: <1.4 PG/ML
IL2 SERPL-MCNC: 785.8 PG/ML
IL2 SERPL-MCNC: <2.1 PG/ML
IL4 SERPL-MCNC: <2.2 PG/ML
IL6 SERPL-MCNC: 4.4 PG/ML
IL8 SERPL-MCNC: <3 PG/ML
INTERLEUKIN 1 BETA: <6.5 PG/ML
INTERLEUKIN 5: 2.4 PG/ML

## 2023-07-11 ENCOUNTER — TRANSCRIPTION ENCOUNTER (OUTPATIENT)
Age: 28
End: 2023-07-11

## 2023-07-31 ENCOUNTER — TRANSCRIPTION ENCOUNTER (OUTPATIENT)
Age: 28
End: 2023-07-31

## 2023-09-05 ENCOUNTER — NON-APPOINTMENT (OUTPATIENT)
Age: 28
End: 2023-09-05

## 2023-09-05 ENCOUNTER — APPOINTMENT (OUTPATIENT)
Dept: HEART AND VASCULAR | Facility: CLINIC | Age: 28
End: 2023-09-05
Payer: MEDICAID

## 2023-09-05 VITALS
DIASTOLIC BLOOD PRESSURE: 60 MMHG | TEMPERATURE: 98.7 F | BODY MASS INDEX: 20.83 KG/M2 | OXYGEN SATURATION: 98 % | HEIGHT: 65 IN | WEIGHT: 125 LBS | SYSTOLIC BLOOD PRESSURE: 99 MMHG | HEART RATE: 71 BPM

## 2023-09-05 PROCEDURE — 93000 ELECTROCARDIOGRAM COMPLETE: CPT

## 2023-09-05 PROCEDURE — 99214 OFFICE O/P EST MOD 30 MIN: CPT | Mod: 25

## 2023-09-06 NOTE — ASSESSMENT
[FreeTextEntry1] : - Today no orthostasis and her heart rate has not increased upon standing - Most of her symptoms are mostly orthostatic  - we can consider mestinone if she continues to have severe symptoms - she will start exercises education done and pamphlet given - compression stockings - trial of atenolol 12.5 mg at bedtime for nocturnal palpitations - fu in three months

## 2023-09-06 NOTE — HISTORY OF PRESENT ILLNESS
[FreeTextEntry1] : 28 S[place] POTS Dysautonomia EDS Hypermobile Type MALS s/p arcuate release and removed celiac ganglion resection at [facility] Endometriosis MIgraines Father MI 56 Paternal FH of Aneurysm EMbolism CVA   referred by a friend. wanted to better control her symptoms her POTS has gotten worse since surgery for MALS 12/2022 she is very dizzy when she gets up she has presyncopal symptoms a lot when she gets up has gotten a little better overwhelming fatigue. SHe is supposed to drink a lot of water and cannot drink that much. she wears compression stockings which do help. She is pretty active at work she stands most of the day she is a . she rides her bike a lot. she has a hard time walking up stairs she feels very dizzy. she also has palpitations. intolerant of bb due to fatigue. she is intolerant of fludrocortisone with swelling, midodrine due bloating and consitipation.    today returns feeling better but does have a lot of orthostatic symptoms she is exercising a lot she wakes up at night  feels like her heart rate is racing    ecg nsr 9/5/2023 Echo EF normal IAS aneurysm 5/15/2023

## 2023-10-06 ENCOUNTER — APPOINTMENT (OUTPATIENT)
Dept: RHEUMATOLOGY | Facility: CLINIC | Age: 28
End: 2023-10-06
Payer: MEDICAID

## 2023-10-06 DIAGNOSIS — G90.1 FAMILIAL DYSAUTONOMIA [RILEY-DAY]: ICD-10-CM

## 2023-10-06 DIAGNOSIS — R21 RASH AND OTHER NONSPECIFIC SKIN ERUPTION: ICD-10-CM

## 2023-10-06 DIAGNOSIS — M25.50 PAIN IN UNSPECIFIED JOINT: ICD-10-CM

## 2023-10-06 DIAGNOSIS — M35.01 SICCA SYNDROME WITH KERATOCONJUNCTIVITIS: ICD-10-CM

## 2023-10-06 DIAGNOSIS — Q79.62 HYPERMOBILE EHLERS-DANLOS SYNDROME: ICD-10-CM

## 2023-10-06 DIAGNOSIS — G90.A POSTURAL ORTHOSTATIC TACHYCARDIA SYNDROME [POTS]: ICD-10-CM

## 2023-10-06 PROCEDURE — 99215 OFFICE O/P EST HI 40 MIN: CPT | Mod: 95

## 2023-10-06 RX ORDER — HYDROXYCHLOROQUINE SULFATE 200 MG/1
200 TABLET, FILM COATED ORAL
Qty: 90 | Refills: 3 | Status: ACTIVE | COMMUNITY
Start: 2023-10-06 | End: 1900-01-01

## 2023-10-06 RX ORDER — TRIAMCINOLONE ACETONIDE 1 MG/G
0.1 CREAM TOPICAL
Qty: 1 | Refills: 1 | Status: ACTIVE | COMMUNITY
Start: 2023-10-06 | End: 1900-01-01

## 2023-11-29 PROBLEM — M25.50 POLYARTHRALGIA: Status: ACTIVE | Noted: 2023-04-10

## 2023-11-29 PROBLEM — G90.1 DYSAUTONOMIA: Status: ACTIVE | Noted: 2023-02-07

## 2023-11-29 PROBLEM — Q79.62 HYPERMOBILE EHLERS-DANLOS SYNDROME: Status: ACTIVE | Noted: 2022-11-21

## 2023-11-29 PROBLEM — G90.A POTS (POSTURAL ORTHOSTATIC TACHYCARDIA SYNDROME): Status: ACTIVE | Noted: 2023-02-21

## 2023-12-05 ENCOUNTER — NON-APPOINTMENT (OUTPATIENT)
Age: 28
End: 2023-12-05

## 2023-12-05 ENCOUNTER — APPOINTMENT (OUTPATIENT)
Dept: HEART AND VASCULAR | Facility: CLINIC | Age: 28
End: 2023-12-05
Payer: MEDICAID

## 2023-12-05 VITALS
BODY MASS INDEX: 21.49 KG/M2 | TEMPERATURE: 98.1 F | OXYGEN SATURATION: 99 % | HEIGHT: 65 IN | HEART RATE: 85 BPM | WEIGHT: 128.99 LBS | DIASTOLIC BLOOD PRESSURE: 70 MMHG | SYSTOLIC BLOOD PRESSURE: 98 MMHG

## 2023-12-05 DIAGNOSIS — R55 SYNCOPE AND COLLAPSE: ICD-10-CM

## 2023-12-05 PROCEDURE — 99214 OFFICE O/P EST MOD 30 MIN: CPT | Mod: 25

## 2023-12-05 PROCEDURE — 93000 ELECTROCARDIOGRAM COMPLETE: CPT

## 2023-12-05 RX ORDER — PREDNISONE 5 MG/1
5 TABLET ORAL
Qty: 15 | Refills: 0 | Status: DISCONTINUED | COMMUNITY
Start: 2023-10-06 | End: 2023-12-05

## 2023-12-05 RX ORDER — ATENOLOL 25 MG/1
25 TABLET ORAL
Qty: 90 | Refills: 0 | Status: DISCONTINUED | COMMUNITY
Start: 2023-09-05 | End: 2023-12-05

## 2023-12-17 ENCOUNTER — TRANSCRIPTION ENCOUNTER (OUTPATIENT)
Age: 28
End: 2023-12-17

## 2023-12-30 ENCOUNTER — RX RENEWAL (OUTPATIENT)
Age: 28
End: 2023-12-30

## 2023-12-30 RX ORDER — DROXIDOPA 100 MG/1
100 CAPSULE ORAL
Qty: 90 | Refills: 5 | Status: ACTIVE | COMMUNITY
Start: 2023-12-05 | End: 1900-01-01

## 2024-03-27 ENCOUNTER — TRANSCRIPTION ENCOUNTER (OUTPATIENT)
Age: 29
End: 2024-03-27

## 2024-04-08 ENCOUNTER — APPOINTMENT (OUTPATIENT)
Dept: UROLOGY | Facility: CLINIC | Age: 29
End: 2024-04-08

## 2024-04-18 ENCOUNTER — TRANSCRIPTION ENCOUNTER (OUTPATIENT)
Age: 29
End: 2024-04-18

## 2024-12-05 ENCOUNTER — APPOINTMENT (OUTPATIENT)
Dept: HEART AND VASCULAR | Facility: CLINIC | Age: 29
End: 2024-12-05

## 2025-03-13 ENCOUNTER — NON-APPOINTMENT (OUTPATIENT)
Age: 30
End: 2025-03-13

## 2025-03-14 ENCOUNTER — NON-APPOINTMENT (OUTPATIENT)
Age: 30
End: 2025-03-14

## 2025-03-14 ENCOUNTER — APPOINTMENT (OUTPATIENT)
Dept: VASCULAR SURGERY | Facility: CLINIC | Age: 30
End: 2025-03-14
Payer: COMMERCIAL

## 2025-03-14 VITALS
HEART RATE: 81 BPM | HEIGHT: 66 IN | BODY MASS INDEX: 20.09 KG/M2 | WEIGHT: 125 LBS | DIASTOLIC BLOOD PRESSURE: 73 MMHG | SYSTOLIC BLOOD PRESSURE: 108 MMHG

## 2025-03-14 DIAGNOSIS — I87.1 COMPRESSION OF VEIN: ICD-10-CM

## 2025-03-14 PROCEDURE — 99214 OFFICE O/P EST MOD 30 MIN: CPT

## 2025-03-14 RX ORDER — RIMEGEPANT SULFATE 75 MG/75MG
TABLET, ORALLY DISINTEGRATING ORAL
Refills: 0 | Status: ACTIVE | COMMUNITY

## 2025-03-14 RX ORDER — SPIRONOLACTONE 50 MG/1
50 TABLET ORAL
Refills: 0 | Status: ACTIVE | COMMUNITY

## 2025-03-14 RX ORDER — LEVONORGESTREL 52 MG/1
20.1 INTRAUTERINE DEVICE INTRAUTERINE
Refills: 0 | Status: ACTIVE | COMMUNITY

## 2025-08-27 ENCOUNTER — NON-APPOINTMENT (OUTPATIENT)
Age: 30
End: 2025-08-27